# Patient Record
Sex: FEMALE | Race: WHITE | Employment: FULL TIME | URBAN - METROPOLITAN AREA
[De-identification: names, ages, dates, MRNs, and addresses within clinical notes are randomized per-mention and may not be internally consistent; named-entity substitution may affect disease eponyms.]

---

## 2018-03-05 ENCOUNTER — HOSPITAL ENCOUNTER (OUTPATIENT)
Dept: PHYSICAL THERAPY | Age: 53
Discharge: HOME | End: 2018-03-05
Payer: COMMERCIAL

## 2018-03-05 PROCEDURE — 97140 MANUAL THERAPY 1/> REGIONS: CPT | Performed by: PHYSICAL THERAPIST

## 2018-03-05 PROCEDURE — 97110 THERAPEUTIC EXERCISES: CPT | Performed by: PHYSICAL THERAPIST

## 2018-03-05 PROCEDURE — 97162 PT EVAL MOD COMPLEX 30 MIN: CPT | Performed by: PHYSICAL THERAPIST

## 2018-03-05 NOTE — PROGRESS NOTES
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 Following surgery she was hospitalized for 2 days and then had in home PT for 2 weeks. Currently she rates her pain a 3-4/10 in the L knee. Functional limitations include stair negotiation, prolonged walking, sleeping as she is unable to get comfortable. Pt takes over the counter pain medication and ice during the day for pain management and prescription medication at night to sleep. 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 PMhx: medial meniscectomy 2016. Pt negative for red flags. FOTO: 38/100.       OBJECTIVE    27 min [x]Eval                  []Re-Eval       10 min Therapeutic Exercise:  [x] See flow sheet : established/educated HEP   Rationale: increase ROM and increase strength to improve the patients ability to improve gait stability    10 min Manual Therapy: Technique:      [] S/DTM []IASTM [x]PROM [] Passive Stretching   [] Graston:  [] GT 1  [] GT 2 [] GT 3 [] GT 4 [] GT 4 [] GT 5  [] GT 6  [] Sweep [] Fan [] [place]  [] Brush   []  Swivel []J- Stroke [] Scoop []IFraming     []Manual TPR  [] TDN (see objective; actual needle insertion time not billed)  []Jt manipulation:Gr I [] II []  III [] IV[] V[]  Treatment/Area: L knee Rationale:      decrease pain, increase ROM, increase tissue extensibility and decrease trigger points to improve patient's ability to improve overall gait mechanics/stair negotiation            With   [x] TE   [] TA   [] neuro   [] other: Patient Education: [x] Review HEP    [] Progressed/Changed HEP based on:   [] positioning   [] body mechanics   [] transfers   [] heat/ice application    [] Graston Education: Explained the effects and benefits of Graston Technique therapy including potential for post treatment soreness and bruising. [] Other:      Other Objective/Functional Measures:   Upon evaluation, pt ambulates with decreased TKE/antalgic gait on the L and decreased step length on the R. A/PROM of the L knee is 0-110/112 deg. Mild quad lag on the L with SLR. Incision appears to be healing however, its covered with bandage. Updated/reviewed HEP. Pain Level (0-10 scale) post treatment: 3-4/10    ASSESSMENT/Changes in Function:  [x]  See Plan of Care  []  See progress note/recertification  []  See Discharge Summary         Progress towards goals / Updated goals:  PER POC    PLAN  [x]  Upgrade activities as tolerated     [x]  Continue plan of care  []  Update interventions per flow sheet       []  Discharge due to:_  [x]  Other:2-3x/week for 4 weeks    Justification for Eval Code Complexity:  Patient History : previous knee surgery, chronic pain, recent surgical interventions  Examination see exam   Clinical Presentation: evolving  Clinical Decision Making : FOTO : 45 /100      Jasson Meyers DPT 3/5/2018  10:35 AM    No future appointments.

## 2018-03-07 ENCOUNTER — HOSPITAL ENCOUNTER (OUTPATIENT)
Dept: PHYSICAL THERAPY | Age: 53
Discharge: HOME | End: 2018-03-07
Payer: COMMERCIAL

## 2018-03-07 PROCEDURE — 97110 THERAPEUTIC EXERCISES: CPT | Performed by: PHYSICAL THERAPIST

## 2018-03-07 PROCEDURE — 97140 MANUAL THERAPY 1/> REGIONS: CPT | Performed by: PHYSICAL THERAPIST

## 2018-03-13 ENCOUNTER — HOSPITAL ENCOUNTER (OUTPATIENT)
Dept: PHYSICAL THERAPY | Age: 53
Discharge: HOME | End: 2018-03-13
Payer: COMMERCIAL

## 2018-03-13 PROCEDURE — 97110 THERAPEUTIC EXERCISES: CPT | Performed by: PHYSICAL THERAPIST

## 2018-03-13 PROCEDURE — 97140 MANUAL THERAPY 1/> REGIONS: CPT | Performed by: PHYSICAL THERAPIST

## 2018-03-16 ENCOUNTER — HOSPITAL ENCOUNTER (OUTPATIENT)
Dept: PHYSICAL THERAPY | Age: 53
Discharge: HOME | End: 2018-03-16
Payer: COMMERCIAL

## 2018-03-16 PROCEDURE — 97110 THERAPEUTIC EXERCISES: CPT | Performed by: PHYSICAL THERAPIST

## 2018-03-16 PROCEDURE — 97140 MANUAL THERAPY 1/> REGIONS: CPT | Performed by: PHYSICAL THERAPIST

## 2018-03-19 ENCOUNTER — APPOINTMENT (OUTPATIENT)
Dept: PHYSICAL THERAPY | Age: 53
End: 2018-03-19
Payer: COMMERCIAL

## 2018-03-22 ENCOUNTER — HOSPITAL ENCOUNTER (OUTPATIENT)
Dept: PHYSICAL THERAPY | Age: 53
Discharge: HOME | End: 2018-03-22
Payer: COMMERCIAL

## 2018-03-22 PROCEDURE — 97110 THERAPEUTIC EXERCISES: CPT | Performed by: PHYSICAL THERAPIST

## 2018-03-22 PROCEDURE — 97140 MANUAL THERAPY 1/> REGIONS: CPT | Performed by: PHYSICAL THERAPIST

## 2018-03-27 ENCOUNTER — HOSPITAL ENCOUNTER (OUTPATIENT)
Dept: PHYSICAL THERAPY | Age: 53
Discharge: HOME | End: 2018-03-27
Payer: COMMERCIAL

## 2018-03-27 PROCEDURE — 97016 VASOPNEUMATIC DEVICE THERAPY: CPT | Performed by: PHYSICAL THERAPIST

## 2018-03-27 PROCEDURE — 97110 THERAPEUTIC EXERCISES: CPT | Performed by: PHYSICAL THERAPIST

## 2018-03-27 PROCEDURE — 97140 MANUAL THERAPY 1/> REGIONS: CPT | Performed by: PHYSICAL THERAPIST

## 2018-03-30 ENCOUNTER — HOSPITAL ENCOUNTER (OUTPATIENT)
Dept: PHYSICAL THERAPY | Age: 53
Discharge: HOME | End: 2018-03-30
Payer: COMMERCIAL

## 2018-03-30 PROCEDURE — 97110 THERAPEUTIC EXERCISES: CPT | Performed by: PHYSICAL THERAPIST

## 2018-03-30 PROCEDURE — 97140 MANUAL THERAPY 1/> REGIONS: CPT | Performed by: PHYSICAL THERAPIST

## 2018-03-30 PROCEDURE — 97016 VASOPNEUMATIC DEVICE THERAPY: CPT | Performed by: PHYSICAL THERAPIST

## 2018-03-30 NOTE — PROGRESS NOTES
[address] [facility]  [address] [facility] [address] [address], Margarito mckeon, Ryan Mcclellan  Phone [phone]  Fax [phone] / [address] [address] PHYSICAL THERAPY SERVICES  Patient Name: Andrew Zamudio : 1965   Medical   Diagnosis: Status post right partial knee replacement [Z96.651] Treatment Diagnosis: L knee pain   Onset Date: 18     Referral Source: Ginger Whyte MD [facility]): 3/05/2018   Prior Hospitalization: See medical history Provider #: [id number]   Prior Level of Function: IND   Comorbidities: Previous knee surgery   Medications: Verified on Patient Summary List   The Plan of Care and following information is based on the information from the initial evaluation.   =================================================================================  Assessment / key information:  Pt is a 47year old female sp L partial knee replacement on 18. Following surgery she was hospitalized for 2 days and then had in home PT for 2 weeks. Currently she rates her pain a 3-4/10 in the L knee. Functional limitations include stair negotiation, prolonged walking, sleeping as she is unable to get comfortable. Pt takes over the counter pain medication and ice during the day for pain management and prescription medication at night to sleep. 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 PMhx: medial meniscectomy 2016. Pt negative for red flags. FOTO: 38/100. Upon evaluation, pt ambulates with decreased TKE/antalgic gait on the L and decreased step length on the R. A/PROM of the L knee is 0-110/112 deg. Mild quad lag on the L with SLR. Incision appears to be healing however, its covered with bandage. Updated/reviewed HEP. Pt would benefit from a course of skilled PT to address above deficits to return to PLOF symptom free. =================================================================================  Eval Complexity: History: MEDIUM  Complexity : 1-2 comorbidities / personal factors will impact the outcome/ POC Exam:HIGH Complexity : 4+ Standardized tests and measures addressing body structure, function, activity limitation and / or participation in recreation  Presentation: MEDIUM Complexity : Evolving with changing characteristics  Clinical Decision Making:MEDIUM Complexity : FOTO score of 26-74Overall Complexity:MEDIUM  Problem List: pain affecting function, decrease ROM, decrease strength, edema affecting function, impaired gait/ balance, decrease ADL/ functional abilitiies, decrease activity tolerance, decrease flexibility/ joint mobility and decrease transfer abilities   Treatment Plan may include any combination of the following: Therapeutic exercise, Therapeutic activities, Neuromuscular re-education, Physical agent/modality, Gait/balance training, Manual therapy and Patient education  Patient / Family readiness to learn indicated by: asking questions and trying to perform skills  Persons(s) to be included in education: patient (P)  Barriers to Learning/Limitations: None  Measures taken:    Patient Goal (s): Ability to improve gait   Patient self reported health status: excellent  Rehabilitation Potential: good   Short Term Goals: To be accomplished in  2  weeks:  1. Pt will be IND and compliant with HEP for self-management of symptoms. 2. Pt will improve PROM of the L knee 0-120 deg to prevent secondary complications.  Long Term Goals: To be accomplished in  4  weeks:  1. Pt will improve L knee AROM 0-120 deg to improve gait mechanics. 2. Pt will improve L knee strength to 5/5 to improve stair negotiation. 3. Pt will demonstrate normalized gait pattern on level/unlevel surfaces walking community distances without pain to be able to return to work without restrictions.   4. Pt will improve FOTO score to at least 63/100 as a functional indicator of improved mobility. Frequency / Duration:   Patient to be seen  2-3  times per week for 4  weeks:  Patient / Caregiver education and instruction: exercises  G-Codes (GP): TYE  Therapist Signature: Chrissie Green DPT Date: 0/6/36   Certification Period: NA Time: 7:23 AM   ===========================================================================================  I certify that the above Physical Therapy Services are being furnished while the patient is under my care. I agree with the treatment plan and certify that this therapy is necessary. Physician Signature:        Date:       Time:     Please sign and return to [facility] or you may fax the signed copy to [phone]. Thank you.

## 2018-03-30 NOTE — PROGRESS NOTES
PT DAILY TREATMENT NOTE     Patient Name: Kalen Dobson  Date:3/30/2018  : 1965  [x]  Patient  Verified  Payor: BLUE CROSS / Plan: [address] [place] [address] / Product Type: PPO /    In time:953  Out time:1055   Total Treatment Time (min): 58  Visit #:5 of     Treatment Area: Status post right partial knee replacement [Z96.651]    SUBJECTIVE  Pain Level (0-10 scale): 1/10  Any medication changes, allergies to medications, adverse drug reactions, diagnosis change, or new procedure performed?: [x] No    [] Yes (see summary sheet for update)  Subjective functional status/changes:   [x] No changes reported      OBJECTIVE    Modality rationale: PD   Min Type Additional Details    [] Estim:  []Unatt       []IFC  []Premod                        []Other:  []w/ice   []w/heat  Position:  Location:    [] Estim: []Att    []TENS instruct  []NMES                    []Other:  []w/US   []w/ice   []w/heat  Position:  Location:    []  Traction: [] Cervical       []Lumbar                       [] Prone          []Supine                       []Intermittent   []Continuous Lbs:  [] before manual  [] after manual    []  Ultrasound: []Continuous   [] Pulsed                           []1MHz   []3MHz W/cm2:  Location:    []  Iontophoresis with dexamethasone         Location: [] Take home patch   [] In clinic    []  Ice     []  heat  []  Ice massage  []  Laser   []  Anodyne Position:  Location:    []  Laser with stim  []  Other:  Position:  Location:    []  Vasopneumatic Device Pressure:       [] lo [] med [] hi   Temperature: [] lo [] med [] hi   [] Skin assessment post-treatment:  []intact []redness- no adverse reaction    []redness  adverse reaction:     47 min Therapeutic Exercise:  [x] See flow sheet : - 10 min for warmup   Rationale: increase ROM and increase strength to improve the patients ability to improve overall activity tolerance    15 min Manual Therapy: Technique:      [] S/DTM []IASTM [x]PROM [x] Passive Stretching   [] Graston:  [] GT 1  [] GT 2 [] GT 3 [] GT 4 [] GT 4 [] GT 5  [] GT 6  [] Sweep [] Fan [] [place]  [] Brush   []  Swivel []J- Stroke [] Scoop []IFraming     []Manual TPR  [] TDN (see objective; actual needle insertion time not billed)  []Jt manipulation:Gr I [] II []  III [] IV[] V[]  Treatment/Area:  L knee, desensitization to the L incision with washcloth    Rationale:      decrease pain, increase ROM, increase tissue extensibility and decrease trigger points to improve patient's ability to improve gait mechanics            With   [] TE   [] TA   [] neuro   [] other: Patient Education: [x] Review HEP    [] Progressed/Changed HEP based on:   [] positioning   [] body mechanics   [] transfers   [] heat/ice application    [] Graston Education: Explained the effects and benefits of Graston Technique therapy including potential for post treatment soreness and bruising. [] Other:      Other Objective/Functional Measures:    AROM L knee 0-120 deg    Pain Level (0-10 scale) post treatment: 1/10    ASSESSMENT/Changes in Function:   Noted improvement of the L knee ROM without increased pain. Continue to progress as tolerated. Patient will continue to benefit from skilled PT services to modify and progress therapeutic interventions, address functional mobility deficits, address ROM deficits, address strength deficits, analyze and address soft tissue restrictions and address imbalance/dizziness to attain remaining goals. Progress towards goals / Updated goals: · Short Term Goals: To be accomplished in  2  weeks:  1. Pt will be IND and compliant with HEP for self-management of symptoms. 2. Pt will improve PROM of the L knee 0-120 deg to prevent secondary complications. MET 3/22/18  · Long Term Goals: To be accomplished in  4  weeks:  1. Pt will improve L knee AROM 0-120 deg to improve gait mechanics. Met 3/22/18  2. Pt will improve L knee strength to 5/5 to improve stair negotiation.   3. Pt will demonstrate normalized gait pattern on level/unlevel surfaces walking community distances without pain to be able to return to work without restrictions. Progressing 3/16/18  4. Pt will improve FOTO score to at least 63/100 as a functional indicator of improved mobility.      PLAN  [x]  Upgrade activities as tolerated     [x]  Continue plan of care  []  Update interventions per flow sheet       []  Discharge due to:_  []  Other:     Minda Hdez, DPT 3/22/2018 1122 AM    Future Appointments  Date Time Provider [place]   4/2/2018 3:30 PM Minda Hdez, DPT [facility]   4/5/2018 2:30 PM Minda Hdez, DPT [facility]

## 2018-03-30 NOTE — PROGRESS NOTES
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 It really is weird.      OBJECTIVE    Modality rationale: PD   Min Type Additional Details    [] Estim:  []Unatt       []IFC  []Premod                        []Other:  []w/ice   []w/heat  Position:  Location:    [] Estim: []Att    []TENS instruct  []NMES                    []Other:  []w/US   []w/ice   []w/heat  Position:  Location:    []  Traction: [] Cervical       []Lumbar                       [] Prone          []Supine                       []Intermittent   []Continuous Lbs:  [] before manual  [] after manual    []  Ultrasound: []Continuous   [] Pulsed                           []1MHz   []3MHz W/cm2:  Location:    []  Iontophoresis with dexamethasone         Location: [] Take home patch   [] In clinic    []  Ice     []  heat  []  Ice massage  []  Laser   []  Anodyne Position:  Location:    []  Laser with stim  []  Other:  Position:  Location:    []  Vasopneumatic Device Pressure:       [] lo [] med [] hi   Temperature: [] lo [] med [] hi   [] Skin assessment post-treatment:  []intact []redness- no adverse reaction    []redness  adverse reaction:     37 min Therapeutic Exercise:  [x] See flow sheet : - 5 min for warmup   Rationale: increase ROM and increase strength to improve the patients ability to improve overall activity tolerance    15 min Manual Therapy: Technique:      [] S/DTM []IASTM [x]PROM [x] Passive Stretching   [] Graston:  [] GT 1  [] GT 2 [] GT 3 [] GT 4 [] GT 4 [] GT 5  [] GT 6  [] Sweep [] Fan [] [place]  [] Brush   []  Swivel []J- Stroke [] Scoop []IFraming     []Manual TPR  [] TDN (see objective; actual needle insertion time not billed)  []Jt manipulation:Gr I [] II []  III [] IV[] V[]  Treatment/Area:  L knee, desensitization to the L incision with washcloth    Rationale:      decrease pain, increase ROM, increase tissue extensibility and decrease trigger points to improve patient's ability to improve gait mechanics            With   [] TE   [] TA   [] neuro   [] other: Patient Education: [x] Review HEP    [] Progressed/Changed HEP based on:   [] positioning   [] body mechanics   [] transfers   [] heat/ice application    [] Graston Education: Explained the effects and benefits of Graston Technique therapy including potential for post treatment soreness and bruising. [] Other:      Other Objective/Functional Measures:    Continues to have decreased TKE on the L with antalgic gait    Pain Level (0-10 scale) post treatment: 2/10    ASSESSMENT/Changes in Function:   Improved tolerance to PT with decreased pain this session. Continues to demo decreased knee extension with gait therefore, trial treadmill backwards to increased knee extension. Patient will continue to benefit from skilled PT services to modify and progress therapeutic interventions, address functional mobility deficits, address ROM deficits, address strength deficits, analyze and address soft tissue restrictions and address imbalance/dizziness to attain remaining goals. Progress towards goals / Updated goals: · Short Term Goals: To be accomplished in  2  weeks:  1. Pt will be IND and compliant with HEP for self-management of symptoms. 2. Pt will improve PROM of the L knee 0-120 deg to prevent secondary complications. Progressing 3/13/18  · Long Term Goals:  To be accomplished in  4  weeks:  1. Pt will improve L knee AROM 0-120 deg to improve gait mechanics. Progressing 3/13/18  2. Pt will improve L knee strength to 5/5 to improve stair negotiation. 3. Pt will demonstrate normalized gait pattern on level/unlevel surfaces walking community distances without pain to be able to return to work without restrictions. Progressing 3/16/18  4. Pt will improve FOTO score to at least 63/100 as a functional indicator of improved mobility.      PLAN  [x]  Upgrade activities as tolerated     [x]  Continue plan of care  []  Update interventions per flow sheet       []  Discharge due to:_  [x]  Other: trial treadmill backwards next session to correct gait pattern    Letty Black DPT 3/16/2018 1040 AM    Future Appointments  Date Time Provider [place]   4/2/2018 3:30 PM Letty Black DPT [facility]   4/5/2018 2:30 PM Letty Black DPT [facility]

## 2018-03-30 NOTE — PROGRESS NOTES
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.651]    SUBJECTIVE  Pain Level (0-10 scale): 3/10  Any medication changes, allergies to medications, adverse drug reactions, diagnosis change, or new procedure performed?: [x] No    [] Yes (see summary sheet for update)  Subjective functional status/changes:   [x] No changes reported      OBJECTIVE    Modality rationale: PD   Min Type Additional Details    [] Estim:  []Unatt       []IFC  []Premod                        []Other:  []w/ice   []w/heat  Position:  Location:    [] Estim: []Att    []TENS instruct  []NMES                    []Other:  []w/US   []w/ice   []w/heat  Position:  Location:    []  Traction: [] Cervical       []Lumbar                       [] Prone          []Supine                       []Intermittent   []Continuous Lbs:  [] before manual  [] after manual    []  Ultrasound: []Continuous   [] Pulsed                           []1MHz   []3MHz W/cm2:  Location:    []  Iontophoresis with dexamethasone         Location: [] Take home patch   [] In clinic    []  Ice     []  heat  []  Ice massage  []  Laser   []  Anodyne Position:  Location:    []  Laser with stim  []  Other:  Position:  Location:    []  Vasopneumatic Device Pressure:       [] lo [] med [] hi   Temperature: [] lo [] med [] hi   [] Skin assessment post-treatment:  []intact []redness- no adverse reaction    []redness  adverse reaction:     36 min Therapeutic Exercise:  [x] See flow sheet : - 5 min for warmup   Rationale: increase ROM and increase strength to improve the patients ability to improve overall activity tolerance    15 min Manual Therapy: Technique:      [] S/DTM []IASTM [x]PROM [x] Passive Stretching   [] Graston:  [] GT 1  [] GT 2 [] GT 3 [] GT 4 [] GT 4 [] GT 5  [] GT 6  [] Sweep [] Fan [] [place]  [] Brush   []  Swivel []J- Stroke [] Scoop []IFraming     []Manual TPR  [] TDN (see objective; actual needle insertion time not billed)  []Jt manipulation:Gr I [] II []  III [] IV[] V[]  Treatment/Area:  L knee, desensitization to the L incision with washcloth    Rationale:      decrease pain, increase ROM, increase tissue extensibility and decrease trigger points to improve patient's ability to improve gait mechanics            With   [] TE   [] TA   [] neuro   [] other: Patient Education: [x] Review HEP    [] Progressed/Changed HEP based on:   [] positioning   [] body mechanics   [] transfers   [] heat/ice application    [] Graston Education: Explained the effects and benefits of Graston Technique therapy including potential for post treatment soreness and bruising. [] Other:      Other Objective/Functional Measures:    AROM of the L knee: 0-115/118 deg  Increased sensitivity over the incision/gena incision therefore, pt educated on self desensitization    Pain Level (0-10 scale) post treatment: 3/10    ASSESSMENT/Changes in Function:   Noted improvement of the L knee ROM this session. Continue to progress as tolerated. Patient will continue to benefit from skilled PT services to modify and progress therapeutic interventions, address functional mobility deficits, address ROM deficits, address strength deficits, analyze and address soft tissue restrictions and address imbalance/dizziness to attain remaining goals. Progress towards goals / Updated goals: · Short Term Goals: To be accomplished in  2  weeks:  1. Pt will be IND and compliant with HEP for self-management of symptoms. 2. Pt will improve PROM of the L knee 0-120 deg to prevent secondary complications. Progressing 3/13/18  · Long Term Goals: To be accomplished in  4  weeks:  1.  Pt will improve L knee AROM 0-120 deg to improve gait mechanics. Progressing 3/13/18  2. Pt will improve L knee strength to 5/5 to improve stair negotiation. 3. Pt will demonstrate normalized gait pattern on level/unlevel surfaces walking community distances without pain to be able to return to work without restrictions. 4. Pt will improve FOTO score to at least 63/100 as a functional indicator of improved mobility.      PLAN  [x]  Upgrade activities as tolerated     [x]  Continue plan of care  []  Update interventions per flow sheet       []  Discharge due to:_  [x]  Other: assess tolerance program    Taurus Montenegro DPT 3/13/2018 1040 AM    Future Appointments  Date Time Provider [place]   4/2/2018 3:30 PM Taurus Montenegro DPT [facility]   4/5/2018 2:30 PM Taurus Montenegro DPT [facility]

## 2018-03-30 NOTE — PROGRESS NOTES
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      OBJECTIVE    Modality rationale: Decrease pain and swelling post treatment session   Min Type Additional Details    [] Estim:  []Unatt       []IFC  []Premod                        []Other:  []w/ice   []w/heat  Position:  Location:    [] Estim: []Att    []TENS instruct  []NMES                    []Other:  []w/US   []w/ice   []w/heat  Position:  Location:    []  Traction: [] Cervical       []Lumbar                       [] Prone          []Supine                       []Intermittent   []Continuous Lbs:  [] before manual  [] after manual    []  Ultrasound: []Continuous   [] Pulsed                           []1MHz   []3MHz W/cm2:  Location:    []  Iontophoresis with dexamethasone         Location: [] Take home patch   [] In clinic    []  Ice     []  heat  []  Ice massage  []  Laser   []  Anodyne Position:  Location:    []  Laser with stim  []  Other:  Position:  Location:   15 [x]  Vasopneumatic Device Pressure:       [] lo [x] med [] hi   Temperature: [x] lo [] med [] hi   [] Skin assessment post-treatment:  []intact []redness- no adverse reaction    []redness  adverse reaction:     46 min Therapeutic Exercise:  [x] See flow sheet : - 5 min for warmup, added ankle weights this session   Rationale: increase ROM and increase strength to improve the patients ability to improve overall activity tolerance    15 min Manual Therapy: Technique:      [] S/DTM []IASTM [x]PROM [x] Passive Stretching   [] Graston:  [] GT 1  [] GT 2 [] GT 3 [] GT 4 [] GT 4 [] GT 5  [] GT 6  [] Sweep [] Fan [] [place]  [] Brush   []  Swivel []J- Stroke [] Scoop []IFraming     []Manual TPR  [] TDN (see objective; actual needle insertion time not billed)  []Jt manipulation:Gr I [] II []  III [] IV[] V[]  Treatment/Area:  L knee, desensitization to the L incision with washcloth    Rationale:      decrease pain, increase ROM, increase tissue extensibility and decrease trigger points to improve patient's ability to improve gait mechanics            With   [] TE   [] TA   [] neuro   [] other: Patient Education: [x] Review HEP    [] Progressed/Changed HEP based on:   [] positioning   [] body mechanics   [] transfers   [] heat/ice application    [] Graston Education: Explained the effects and benefits of Graston Technique therapy including potential for post treatment soreness and bruising. [] Other:      Other Objective/Functional Measures:    Decreased sensitivity over incision this session  Performs all therex with increased ease    Pain Level (0-10 scale) post treatment:0/10    ASSESSMENT/Changes in Function:   Pt tolerated all therex progression without increased pain. Added VASO per patient request as she was not able to ice at home. Patient will continue to benefit from skilled PT services to modify and progress therapeutic interventions, address functional mobility deficits, address ROM deficits, address strength deficits, analyze and address soft tissue restrictions and address imbalance/dizziness to attain remaining goals. Progress towards goals / Updated goals: · Short Term Goals: To be accomplished in  2  weeks:  1. Pt will be IND and compliant with HEP for self-management of symptoms. 2. Pt will improve PROM of the L knee 0-120 deg to prevent secondary complications. MET 3/22/18  · Long Term Goals: To be accomplished in  4  weeks:  1. Pt will improve L knee AROM 0-120 deg to improve gait mechanics. Met 3/22/18  2. Pt will improve L knee strength to 5/5 to improve stair negotiation. 3. Pt will demonstrate normalized gait pattern on level/unlevel surfaces walking community distances without pain to be able to return to work without restrictions. Progressing 3/16/18  4. Pt will improve FOTO score to at least 63/100 as a functional indicator of improved mobility.      PLAN  [x]  Upgrade activities as tolerated     [x]  Continue plan of care  [x]  Update interventions per flow sheet       []  Discharge due to:_  [x]  Other: progressed PREs per flow sheet next session    Adeel Hill DPT 3/27/2018 936  AM    Future Appointments  Date Time Provider Rodger Hill   4/2/2018 3:30 PM Adeel Hill DPT [facility]   4/5/2018 2:30 PM Adeel Hill DPT [facility]

## 2018-04-02 ENCOUNTER — HOSPITAL ENCOUNTER (OUTPATIENT)
Dept: PHYSICAL THERAPY | Age: 53
Discharge: HOME | End: 2018-04-02
Payer: COMMERCIAL

## 2018-04-02 PROCEDURE — 97140 MANUAL THERAPY 1/> REGIONS: CPT | Performed by: PHYSICAL THERAPIST

## 2018-04-02 PROCEDURE — 97110 THERAPEUTIC EXERCISES: CPT | Performed by: PHYSICAL THERAPIST

## 2018-04-02 NOTE — PROGRESS NOTES
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      OBJECTIVE    Modality rationale: Decrease pain and swelling post treatment session   Min Type Additional Details    [] Estim:  []Unatt       []IFC  []Premod                        []Other:  []w/ice   []w/heat  Position:  Location:    [] Estim: []Att    []TENS instruct  []NMES                    []Other:  []w/US   []w/ice   []w/heat  Position:  Location:    []  Traction: [] Cervical       []Lumbar                       [] Prone          []Supine                       []Intermittent   []Continuous Lbs:  [] before manual  [] after manual    []  Ultrasound: []Continuous   [] Pulsed                           []1MHz   []3MHz W/cm2:  Location:    []  Iontophoresis with dexamethasone         Location: [] Take home patch   [] In clinic    []  Ice     []  heat  []  Ice massage  []  Laser   []  Anodyne Position:  Location:    []  Laser with stim  []  Other:  Position:  Location:   pd [x]  Vasopneumatic Device Pressure:       [] lo [x] med [] hi   Temperature: [x] lo [] med [] hi   [] Skin assessment post-treatment:  []intact []redness- no adverse reaction    []redness  adverse reaction:     40 min Therapeutic Exercise:  [x] See flow sheet : - 5 min for warmup, increased ankle weights this session to 3#   Rationale: increase ROM and increase strength to improve the patients ability to improve overall activity tolerance    15 min Manual Therapy: Technique:      [] S/DTM []IASTM [x]PROM [x] Passive Stretching   [] Graston:  [] GT 1  [] GT 2 [] GT 3 [] GT 4 [] GT 4 [] GT 5  [] GT 6  [] Sweep [] Fan [] [place]  [] Brush   []  Swivel []J- Stroke [] Scoop []IFraming     []Manual TPR  [] TDN (see objective; actual needle insertion time not billed)  []Jt manipulation:Gr I [] II []  III [] IV[] V[]  Treatment/Area:  L knee, desensitization to the L incision with washcloth    Rationale:      decrease pain, increase ROM, increase tissue extensibility and decrease trigger points to improve patient's ability to improve gait mechanics            With   [] TE   [] TA   [] neuro   [] other: Patient Education: [x] Review HEP    [] Progressed/Changed HEP based on:   [] positioning   [] body mechanics   [] transfers   [] heat/ice application    [] Graston Education: Explained the effects and benefits of Graston Technique therapy including potential for post treatment soreness and bruising. [] Other:      Other Objective/Functional Measures:    AROM 0-122 deg    Pain Level (0-10 scale) post treatment:0/10    ASSESSMENT/Changes in Function:   Pt tolerated progressed PREs without increased pain. Continue to progress as tolerated. Patient will continue to benefit from skilled PT services to modify and progress therapeutic interventions, address functional mobility deficits, address ROM deficits, address strength deficits, analyze and address soft tissue restrictions and address imbalance/dizziness to attain remaining goals. Progress towards goals / Updated goals: · Short Term Goals: To be accomplished in  2  weeks:  1. Pt will be IND and compliant with HEP for self-management of symptoms. 2. Pt will improve PROM of the L knee 0-120 deg to prevent secondary complications. MET 3/22/18  · Long Term Goals: To be accomplished in  4  weeks:  1.  Pt will improve L knee AROM 0-120 deg to improve gait mechanics. Met 3/22/18  2. Pt will improve L knee strength to 5/5 to improve stair negotiation. 3. Pt will demonstrate normalized gait pattern on level/unlevel surfaces walking community distances without pain to be able to return to work without restrictions. Progressing 3/16/18  4. Pt will improve FOTO score to at least 63/100 as a functional indicator of improved mobility.      PLAN  [x]  Upgrade activities as tolerated     [x]  Continue plan of care  [x]  Update interventions per flow sheet       []  Discharge due to:_  [x]  Other: progressed PREs per flow sheet next session    Marco Antonio Floyd DPT 4/2//2018 529 PM    Future Appointments  Date Time Provider Rodger Hill   4/5/2018 2:30 PM Maroc Antonio Floyd DPT [facility]

## 2018-04-05 ENCOUNTER — HOSPITAL ENCOUNTER (OUTPATIENT)
Dept: PHYSICAL THERAPY | Age: 53
Discharge: HOME | End: 2018-04-05
Payer: COMMERCIAL

## 2018-04-05 PROCEDURE — 97110 THERAPEUTIC EXERCISES: CPT | Performed by: PHYSICAL THERAPIST

## 2018-04-05 NOTE — PROGRESS NOTES
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 She denies pain or functional limitations at this time. She has returned to work without restrictions. OBJECTIVE      51 min Therapeutic Exercise:  [x] See flow sheet : -PT reassessment   Rationale: increase ROM and increase strength to improve the patients ability to improve overall activity tolerance        With   [] TE   [] TA   [] neuro   [] other: Patient Education: [x] Review HEP    [] Progressed/Changed HEP based on:   [] positioning   [] body mechanics   [] transfers   [] heat/ice application    [] Graston Education: Explained the effects and benefits of Graston Technique therapy including potential for post treatment soreness and bruising. [] Other:      Other Objective/Functional Measures:    AROM of the L knee 0-122 deg  FOTO improved to 75/100 from evaluation at 38/100  Pt is able to ascend/descend 4 therapy stairs without restrictions  L knee strength is a 5/5      Pain Level (0-10 scale) post treatment:0/10    ASSESSMENT/Changes in Function:   Upon reassessment, pt presents with improvements in L knee ROM and strength leading to improvements in functional mobility. At this time pt has met all goals and is ready to to be DC'd to home program to continue to manage symptoms IND. Progress towards goals / Updated goals:   · Short Term Goals: To be accomplished in  2  weeks:  1. Pt will be IND and compliant with HEP for self-management of symptoms. Met 4/5/18  2. Pt will improve PROM of the L knee 0-120 deg to prevent secondary complications. MET 3/22/18  · Long Term Goals: To be accomplished in  4  weeks:  1. Pt will improve L knee AROM 0-120 deg to improve gait mechanics. Met 3/22/18  2. Pt will improve L knee strength to 5/5 to improve stair negotiation. Met 4/5/18  3. Pt will demonstrate normalized gait pattern on level/unlevel surfaces walking community distances without pain to be able to return to work without restrictions. Met 4/5/18  4. Pt will improve FOTO score to at least 63/100 as a functional indicator of improved mobility.  Met 4/5/18    PLAN  []  Upgrade activities as tolerated     []  Continue plan of care  []  Update interventions per flow sheet       [x]  Discharge due to: all goals met, IND with HEP  []  Other:     Kerry Hinton DPT 4/5/2018 529 PM    Future Appointments  Date Time Provider Rodger Hill   4/5/2018 2:30 PM Kerry Hinton DPT [facility]

## 2019-04-16 NOTE — PROGRESS NOTES
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    
Date of Initial Visit: 3/5/18 Attended Visits: 9 Missed Visits: 9 SUMMARY OF TREATMENT Pt seen for 9 therapy visits for L partial knee replacement on 18. Therapy included therex for strength/stability, manual to improve tissue extensibility, and modalities for pain management to return to OF symptom free. CURRENT STATUS Pt reports 100% improvement since [facility]. She denies pain or functional limitations at this time. She has returned to work without restrictions. Upon reassessment, pt presents with improvements in L knee ROM and strength leading to improvements in functional mobility. At this time pt has met all goals and is ready to to be DC'd to home program to continue to manage symptoms IND. 
 
  
Other Objective/Functional Measures: AROM of the L knee 0-122 deg FOTO improved to 75/100 from evaluation at 38/100 Pt is able to ascend/descend 4 therapy stairs without restrictions L knee strength is a 5/5 Goal/Measure of Progress Goal Met? · Short Term Goals: To be accomplished in  2  weeks: 1. Pt will be IND and compliant with HEP for self-management of symptoms. Met 18 2. Pt will improve PROM of the L knee 0-120 deg to prevent secondary complications.  MET 3/22/18 · Long Term Goals: To be accomplished in  4  weeks: 1. Pt will improve L knee AROM 0-120 deg to improve gait mechanics. Met 3/22/18 2. Pt will improve L knee strength to 5/5 to improve stair negotiation. Met 18 3.  Pt will demonstrate normalized gait pattern on level/unlevel surfaces walking community distances without pain to be able to return to work without restrictions. Met 4/5/18 4. Pt will improve FOTO score to at least 63/100 as a functional indicator of improved mobility. Met 4/5/18 
  
 
 
RECOMMENDATIONS Discontinue therapy. Progressing towards or have reached established goals. If you have any questions/comments please contact us directly at [phone]. Thank you for allowing us to assist in the care of your patient. Therapist Signature: Tanisha Jordan DPT Date: 4/5/2018   Time: 6:00 PM